# Patient Record
Sex: FEMALE | Race: WHITE | NOT HISPANIC OR LATINO | Employment: OTHER | URBAN - METROPOLITAN AREA
[De-identification: names, ages, dates, MRNs, and addresses within clinical notes are randomized per-mention and may not be internally consistent; named-entity substitution may affect disease eponyms.]

---

## 2024-08-12 ENCOUNTER — APPOINTMENT (OUTPATIENT)
Dept: CT IMAGING | Facility: CLINIC | Age: 79
End: 2024-08-12
Attending: STUDENT IN AN ORGANIZED HEALTH CARE EDUCATION/TRAINING PROGRAM
Payer: MEDICARE

## 2024-08-12 ENCOUNTER — HOSPITAL ENCOUNTER (EMERGENCY)
Facility: CLINIC | Age: 79
Discharge: HOME | End: 2024-08-12
Attending: EMERGENCY MEDICINE | Admitting: STUDENT IN AN ORGANIZED HEALTH CARE EDUCATION/TRAINING PROGRAM
Payer: MEDICARE

## 2024-08-12 VITALS
HEART RATE: 73 BPM | SYSTOLIC BLOOD PRESSURE: 141 MMHG | OXYGEN SATURATION: 97 % | RESPIRATION RATE: 18 BRPM | TEMPERATURE: 98.4 F | DIASTOLIC BLOOD PRESSURE: 61 MMHG | WEIGHT: 160 LBS

## 2024-08-12 DIAGNOSIS — K57.92 ACUTE DIVERTICULITIS: ICD-10-CM

## 2024-08-12 LAB
ALBUMIN SERPL BCG-MCNC: 4.1 G/DL (ref 3.5–5.2)
ALBUMIN UR-MCNC: NEGATIVE MG/DL
ALP SERPL-CCNC: 81 U/L (ref 40–150)
ALT SERPL W P-5'-P-CCNC: 22 U/L (ref 0–50)
ANION GAP SERPL CALCULATED.3IONS-SCNC: 11 MMOL/L (ref 7–15)
APPEARANCE UR: CLEAR
AST SERPL W P-5'-P-CCNC: 22 U/L (ref 0–45)
BASOPHILS # BLD MANUAL: 0.1 10E3/UL (ref 0–0.2)
BASOPHILS NFR BLD MANUAL: 1 %
BILIRUB SERPL-MCNC: 0.2 MG/DL
BILIRUB UR QL STRIP: NEGATIVE
BUN SERPL-MCNC: 17.4 MG/DL (ref 8–23)
CALCIUM SERPL-MCNC: 9.1 MG/DL (ref 8.8–10.4)
CHLORIDE SERPL-SCNC: 106 MMOL/L (ref 98–107)
COLOR UR AUTO: YELLOW
CREAT SERPL-MCNC: 0.74 MG/DL (ref 0.51–0.95)
EGFRCR SERPLBLD CKD-EPI 2021: 81 ML/MIN/1.73M2
EOSINOPHIL # BLD MANUAL: 0 10E3/UL (ref 0–0.7)
EOSINOPHIL NFR BLD MANUAL: 0 %
ERYTHROCYTE [DISTWIDTH] IN BLOOD BY AUTOMATED COUNT: 12.8 % (ref 10–15)
GLUCOSE SERPL-MCNC: 106 MG/DL (ref 70–99)
GLUCOSE UR STRIP-MCNC: NEGATIVE MG/DL
HCO3 SERPL-SCNC: 24 MMOL/L (ref 22–29)
HCT VFR BLD AUTO: 43.4 % (ref 35–47)
HGB BLD-MCNC: 14.7 G/DL (ref 11.7–15.7)
HGB UR QL STRIP: NEGATIVE
KETONES UR STRIP-MCNC: NEGATIVE MG/DL
LEUKOCYTE ESTERASE UR QL STRIP: NEGATIVE
LIPASE SERPL-CCNC: 18 U/L (ref 13–60)
LYMPHOCYTES # BLD MANUAL: 1.9 10E3/UL (ref 0.8–5.3)
LYMPHOCYTES NFR BLD MANUAL: 16 %
MCH RBC QN AUTO: 32 PG (ref 26.5–33)
MCHC RBC AUTO-ENTMCNC: 33.9 G/DL (ref 31.5–36.5)
MCV RBC AUTO: 95 FL (ref 78–100)
MONOCYTES # BLD MANUAL: 1.4 10E3/UL (ref 0–1.3)
MONOCYTES NFR BLD MANUAL: 12 %
MUCOUS THREADS #/AREA URNS LPF: PRESENT /LPF
NEUTROPHILS # BLD MANUAL: 8.4 10E3/UL (ref 1.6–8.3)
NEUTROPHILS NFR BLD MANUAL: 71 %
NITRATE UR QL: NEGATIVE
NRBC # BLD AUTO: 0 10E3/UL
NRBC BLD AUTO-RTO: 0 /100
PH UR STRIP: 5 [PH] (ref 5–7)
PLAT MORPH BLD: ABNORMAL
PLATELET # BLD AUTO: 282 10E3/UL (ref 150–450)
POTASSIUM SERPL-SCNC: 4 MMOL/L (ref 3.4–5.3)
PROT SERPL-MCNC: 6.8 G/DL (ref 6.4–8.3)
RBC # BLD AUTO: 4.59 10E6/UL (ref 3.8–5.2)
RBC MORPH BLD: ABNORMAL
RBC URINE: 1 /HPF
SODIUM SERPL-SCNC: 141 MMOL/L (ref 135–145)
SP GR UR STRIP: 1.01 (ref 1–1.03)
SQUAMOUS EPITHELIAL: 3 /HPF
UROBILINOGEN UR STRIP-MCNC: NORMAL MG/DL
WBC # BLD AUTO: 11.9 10E3/UL (ref 4–11)
WBC URINE: 3 /HPF

## 2024-08-12 PROCEDURE — 80053 COMPREHEN METABOLIC PANEL: CPT | Performed by: STUDENT IN AN ORGANIZED HEALTH CARE EDUCATION/TRAINING PROGRAM

## 2024-08-12 PROCEDURE — 96374 THER/PROPH/DIAG INJ IV PUSH: CPT | Mod: 59 | Performed by: STUDENT IN AN ORGANIZED HEALTH CARE EDUCATION/TRAINING PROGRAM

## 2024-08-12 PROCEDURE — 36415 COLL VENOUS BLD VENIPUNCTURE: CPT | Performed by: STUDENT IN AN ORGANIZED HEALTH CARE EDUCATION/TRAINING PROGRAM

## 2024-08-12 PROCEDURE — 81001 URINALYSIS AUTO W/SCOPE: CPT | Performed by: STUDENT IN AN ORGANIZED HEALTH CARE EDUCATION/TRAINING PROGRAM

## 2024-08-12 PROCEDURE — 85007 BL SMEAR W/DIFF WBC COUNT: CPT | Performed by: STUDENT IN AN ORGANIZED HEALTH CARE EDUCATION/TRAINING PROGRAM

## 2024-08-12 PROCEDURE — 83690 ASSAY OF LIPASE: CPT | Performed by: STUDENT IN AN ORGANIZED HEALTH CARE EDUCATION/TRAINING PROGRAM

## 2024-08-12 PROCEDURE — 250N000009 HC RX 250: Performed by: STUDENT IN AN ORGANIZED HEALTH CARE EDUCATION/TRAINING PROGRAM

## 2024-08-12 PROCEDURE — 74177 CT ABD & PELVIS W/CONTRAST: CPT | Mod: MG

## 2024-08-12 PROCEDURE — 99284 EMERGENCY DEPT VISIT MOD MDM: CPT | Performed by: STUDENT IN AN ORGANIZED HEALTH CARE EDUCATION/TRAINING PROGRAM

## 2024-08-12 PROCEDURE — 250N000011 HC RX IP 250 OP 636: Performed by: STUDENT IN AN ORGANIZED HEALTH CARE EDUCATION/TRAINING PROGRAM

## 2024-08-12 PROCEDURE — 99285 EMERGENCY DEPT VISIT HI MDM: CPT | Mod: 25 | Performed by: STUDENT IN AN ORGANIZED HEALTH CARE EDUCATION/TRAINING PROGRAM

## 2024-08-12 PROCEDURE — 85027 COMPLETE CBC AUTOMATED: CPT | Performed by: STUDENT IN AN ORGANIZED HEALTH CARE EDUCATION/TRAINING PROGRAM

## 2024-08-12 RX ORDER — ONDANSETRON 4 MG/1
4 TABLET, ORALLY DISINTEGRATING ORAL EVERY 8 HOURS PRN
Qty: 10 TABLET | Refills: 0 | Status: SHIPPED | OUTPATIENT
Start: 2024-08-12

## 2024-08-12 RX ORDER — ONDANSETRON 2 MG/ML
4 INJECTION INTRAMUSCULAR; INTRAVENOUS EVERY 30 MIN PRN
Status: DISCONTINUED | OUTPATIENT
Start: 2024-08-12 | End: 2024-08-13 | Stop reason: HOSPADM

## 2024-08-12 RX ORDER — IOPAMIDOL 755 MG/ML
78 INJECTION, SOLUTION INTRAVASCULAR ONCE
Status: COMPLETED | OUTPATIENT
Start: 2024-08-12 | End: 2024-08-12

## 2024-08-12 RX ORDER — OXYCODONE HYDROCHLORIDE 5 MG/1
2.5-5 TABLET ORAL EVERY 6 HOURS PRN
Qty: 6 TABLET | Refills: 0 | Status: SHIPPED | OUTPATIENT
Start: 2024-08-12

## 2024-08-12 RX ADMIN — SODIUM CHLORIDE 59 ML: 9 INJECTION, SOLUTION INTRAVENOUS at 22:15

## 2024-08-12 RX ADMIN — IOPAMIDOL 78 ML: 755 INJECTION, SOLUTION INTRAVENOUS at 22:12

## 2024-08-12 RX ADMIN — ONDANSETRON 4 MG: 2 INJECTION INTRAMUSCULAR; INTRAVENOUS at 21:55

## 2024-08-12 ASSESSMENT — ACTIVITIES OF DAILY LIVING (ADL)
ADLS_ACUITY_SCORE: 35

## 2024-08-12 ASSESSMENT — COLUMBIA-SUICIDE SEVERITY RATING SCALE - C-SSRS
2. HAVE YOU ACTUALLY HAD ANY THOUGHTS OF KILLING YOURSELF IN THE PAST MONTH?: NO
6. HAVE YOU EVER DONE ANYTHING, STARTED TO DO ANYTHING, OR PREPARED TO DO ANYTHING TO END YOUR LIFE?: NO
1. IN THE PAST MONTH, HAVE YOU WISHED YOU WERE DEAD OR WISHED YOU COULD GO TO SLEEP AND NOT WAKE UP?: NO

## 2024-08-13 NOTE — ED TRIAGE NOTES
Pt presents with concerns of lower left abd wrapping around to left groin area. Pt states pain is worse after eating. Pt states she last took aleve and tylenol around 1800.      Triage Assessment (Adult)       Row Name 08/12/24 6132          Triage Assessment    Airway WDL WDL        Respiratory WDL    Respiratory WDL WDL        Skin Circulation/Temperature WDL    Skin Circulation/Temperature WDL WDL        Cardiac WDL    Cardiac WDL WDL        Peripheral/Neurovascular WDL    Peripheral Neurovascular WDL WDL        Cognitive/Neuro/Behavioral WDL    Cognitive/Neuro/Behavioral WDL WDL

## 2024-08-13 NOTE — ED PROVIDER NOTES
History     Chief Complaint   Patient presents with    Abdominal Pain     HPI  Vera Lyon is a 78 year old female who has asthma, GERD, hypertension, hypothyroidism, history of SVT, history of CVA, history of polymyalgia rheumatica who presents to the emergency department for evaluation of abdominal pain.  Patient states that she has had left-sided abdominal pain that has come and gone over the last 4 days.  However, she states that pain has been progressively worsening.  She was going to wait and go to the doctor tomorrow, but the pain became too intense.  She endorses a sharp pain in her left lower abdomen that radiates into her left groin.  She states that her pain is worsened after eating.  She states that it seems to worsen with all types of food.  She endorses nausea without vomiting.  She denies fever.  She had a normal bowel movement 3 or 4 hours ago.  She denies diarrhea, melena or hematochezia.  She has had no issues urinating and denies dysuria, urgency or frequency.  She has no hematuria.  No history of kidney stones.  No chest pain or trouble breathing.  No rashes.  No trauma or injuries.  She has chronic back pain, but states her back pain is not any different than usual.  Abdominal surgical history notable for  and hysterectomy.  She reports having a colonoscopy in the past that showed diverticulosis.  She denies a history of diverticulitis.  She denies alcohol or drug use.  She has been taking Tylenol and ibuprofen which has been helpful.  She last took a dose about 2-1/2 hours prior to arrival and she reports feeling better after this.  She called the nurse triage line who recommended she come in for evaluation.    Allergies:  No Known Allergies    Problem List:    There are no problems to display for this patient.       Past Medical History:    No past medical history on file.    Past Surgical History:    No past surgical history on file.    Family History:    No family history on  file.    Social History:  Marital Status:   [2]        Medications:    amoxicillin-clavulanate (AUGMENTIN) 875-125 MG tablet  ondansetron (ZOFRAN ODT) 4 MG ODT tab  oxyCODONE (ROXICODONE) 5 MG tablet          Review of Systems  See HPI  Physical Exam   BP: (!) 163/61  Pulse: 83  Temp: 98.4  F (36.9  C)  Resp: 18  Weight: 72.6 kg (160 lb)  SpO2: 98 %      Physical Exam  BP (!) 141/61   Pulse 73   Temp 98.4  F (36.9  C) (Oral)   Resp 18   Wt 72.6 kg (160 lb)   SpO2 97%   General: alert, interactive, in no apparent distress  Head: atraumatic  Nose: no rhinorrhea or epistaxis  Ears: no external auditory canal discharge or bleeding.    Eyes: Sclera nonicteric. Conjunctiva noninjected. PERRL, EOMI  Mouth: no tonsillar erythema, edema, or exudate.  Moist mucous membranes  Neck: supple, moving spontaneously no midline cervical tenderness  Lungs: No increased work of breathing.  Clear to auscultation bilaterally.  CV: RRR, peripheral pulses palpable and symmetric  Abdomen: soft, tender to palpation in the left lower quadrant.  No rigidity or rebound or guarding.  Extremities: Warm and well-perfused.    Skin: no rash or diaphoresis  Neuro: CN II-XII grossly intact, strength 5/5 in UE and LEs bilaterally, sensation intact to light touch in UE and LEs bilaterally;     ED Course        Procedures             Critical Care time:  none             Results for orders placed or performed during the hospital encounter of 08/12/24 (from the past 24 hour(s))   UA with Microscopic reflex to Culture    Specimen: Urine, Clean Catch   Result Value Ref Range    Color Urine Yellow Colorless, Straw, Light Yellow, Yellow    Appearance Urine Clear Clear    Glucose Urine Negative Negative mg/dL    Bilirubin Urine Negative Negative    Ketones Urine Negative Negative mg/dL    Specific Gravity Urine 1.014 1.003 - 1.035    Blood Urine Negative Negative    pH Urine 5.0 5.0 - 7.0    Protein Albumin Urine Negative Negative mg/dL     Urobilinogen Urine Normal Normal, 2.0 mg/dL    Nitrite Urine Negative Negative    Leukocyte Esterase Urine Negative Negative    Mucus Urine Present (A) None Seen /LPF    RBC Urine 1 <=2 /HPF    WBC Urine 3 <=5 /HPF    Squamous Epithelials Urine 3 (H) <=1 /HPF    Narrative    Urine Culture not indicated   CBC with platelets differential    Narrative    The following orders were created for panel order CBC with platelets differential.  Procedure                               Abnormality         Status                     ---------                               -----------         ------                     CBC with platelets and d...[[id number]]  Abnormal            Final result               Manual Differential[[id number]]          Abnormal            Final result                 Please view results for these tests on the individual orders.   Comprehensive metabolic panel   Result Value Ref Range    Sodium 141 135 - 145 mmol/L    Potassium 4.0 3.4 - 5.3 mmol/L    Carbon Dioxide (CO2) 24 22 - 29 mmol/L    Anion Gap 11 7 - 15 mmol/L    Urea Nitrogen 17.4 8.0 - 23.0 mg/dL    Creatinine 0.74 0.51 - 0.95 mg/dL    GFR Estimate 81 >60 mL/min/1.73m2    Calcium 9.1 8.8 - 10.4 mg/dL    Chloride 106 98 - 107 mmol/L    Glucose 106 (H) 70 - 99 mg/dL    Alkaline Phosphatase 81 40 - 150 U/L    AST 22 0 - 45 U/L    ALT 22 0 - 50 U/L    Protein Total 6.8 6.4 - 8.3 g/dL    Albumin 4.1 3.5 - 5.2 g/dL    Bilirubin Total 0.2 <=1.2 mg/dL   Lipase   Result Value Ref Range    Lipase 18 13 - 60 U/L   CBC with platelets and differential   Result Value Ref Range    WBC Count 11.9 (H) 4.0 - 11.0 10e3/uL    RBC Count 4.59 3.80 - 5.20 10e6/uL    Hemoglobin 14.7 11.7 - 15.7 g/dL    Hematocrit 43.4 35.0 - 47.0 %    MCV 95 78 - 100 fL    MCH 32.0 26.5 - 33.0 pg    MCHC 33.9 31.5 - 36.5 g/dL    RDW 12.8 10.0 - 15.0 %    Platelet Count 282 150 - 450 10e3/uL    NRBCs per 100 WBC 0 <1 /100    Absolute NRBCs 0.0 10e3/uL   Manual Differential   Result Value  Ref Range    % Neutrophils 71 %    % Lymphocytes 16 %    % Monocytes 12 %    % Eosinophils 0 %    % Basophils 1 %    Absolute Neutrophils 8.4 (H) 1.6 - 8.3 10e3/uL    Absolute Lymphocytes 1.9 0.8 - 5.3 10e3/uL    Absolute Monocytes 1.4 (H) 0.0 - 1.3 10e3/uL    Absolute Eosinophils 0.0 0.0 - 0.7 10e3/uL    Absolute Basophils 0.1 0.0 - 0.2 10e3/uL    RBC Morphology Confirmed RBC Indices     Platelet Assessment  Automated Count Confirmed. Platelet morphology is normal.     Automated Count Confirmed. Platelet morphology is normal.   CT Abdomen Pelvis w Contrast    Narrative    EXAM: CT ABDOMEN PELVIS W CONTRAST  LOCATION: [facility]  DATE: 8/12/2024    INDICATION: Left lower quadrant abdominal pain  COMPARISON: CT abdomen and pelvis 3/23/2024  TECHNIQUE: CT scan of the abdomen and pelvis was performed following injection of IV contrast. Multiplanar reformats were obtained. Dose reduction techniques were used.  CONTRAST: 78 ml Isovue 370    FINDINGS:   LOWER CHEST: Small hiatal hernia.    HEPATOBILIARY: Cholecystectomy.    PANCREAS: Normal.    SPLEEN: Calcified granulomas.    ADRENAL GLANDS: Normal.    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 Otherwise unremarkable.    BOWEL: Acute diverticulitis of the distal descending colon. No evidence of perforation or abscess.    LYMPH NODES: Normal.    VASCULATURE: Unremarkable    PELVIC ORGANS: Hysterectomy    MUSCULOSKELETAL: Unremarkable      Impression    IMPRESSION:   1.  Acute uncomplicated diverticulitis of the distal descending colon.       Medications   ondansetron (ZOFRAN) injection 4 mg (4 mg Intravenous $Given 8/12/24 2154)   iopamidol (ISOVUE-370) solution 78 mL (78 mLs Intravenous $Given 8/12/24 2212)   sodium chloride 0.9 % bag 500mL for CT scan flush use (59 mLs As instructed $Given 8/12/24 2215)       Assessments & Plan (with Medical Decision Making)     I have reviewed the nursing notes.    I have  reviewed the findings, diagnosis, plan and need for follow up with the patient.          Medical Decision Making  Vera Lyon is a 78 year old female who has asthma, GERD, hypertension, hypothyroidism, history of SVT, history of CVA, history of polymyalgia rheumatica who presents to the emergency department for evaluation of abdominal pain.  Vital signs reviewed notable for hypertension, otherwise afebrile and reassuring.  Patient is nontoxic on exam.  Pain is actually well-controlled at this time, patient offered pain medication and she deferred for now.  She is given Zofran for symptoms.  Labs and imaging independently interpreted by me.  She is noted to have a mild leukocytosis of 11.9 with a left shift.  She is not anemic.  CMP, UA and lipase are normal.  CT of the abdomen pelvis was independently reviewed by me and radiology report reviewed.  Patient noted to have uncomplicated diverticulitis of the descending colon.  Patient was reassessed and reports feeling better.  I think a trial of outpatient management is appropriate.  We discussed watching and waiting versus a trial of antibiotics and patient would like to try antibiotics.  Will prescribe Augmentin.  We also discussed initiating a liquid diet and advancing diet as tolerated.  I provided Zofran and oxycodone to use as needed.  Recommended close outpatient follow-up to ensure she is improving and to discuss if she needs another colonoscopy.  Strict return precautions were discussed.  All questions were answered.  The patient is discharged in stable condition peer        New Prescriptions    AMOXICILLIN-CLAVULANATE (AUGMENTIN) 875-125 MG TABLET    Take 1 tablet by mouth 2 times daily    ONDANSETRON (ZOFRAN ODT) 4 MG ODT TAB    Take 1 tablet (4 mg) by mouth every 8 hours as needed for nausea    OXYCODONE (ROXICODONE) 5 MG TABLET    Take 0.5-1 tablets (2.5-5 mg) by mouth every 6 hours as needed for severe pain       Final diagnoses:   Acute  diverticulitis       8/12/2024   [facility] EMERGENCY DEPT       Sabino Chacon MD  08/12/24 5323       Sabino Chacon MD  08/12/24 3085

## 2024-08-13 NOTE — DISCHARGE INSTRUCTIONS
You were diagnosed with diverticulitis.  This is inflammation of one of the pouches of your colon.  You can read the attached documents for additional information.  This will be treated with antibiotics and a liquid diet.  You should initiate a liquid diet now and gradually increase your diet as you are feeling better.  Use Tylenol and ibuprofen as needed for pain.  Use the oxycodone for severe pain only.  Use the Zofran as needed for nausea or vomiting.  Follow-up with your regular doctor soon as you are able.  If you develop fever, worsening pain, or any other new or concerning symptoms return to the ER.

## 2024-10-22 ENCOUNTER — HOSPITAL ENCOUNTER (EMERGENCY)
Facility: CLINIC | Age: 79
Discharge: HOME | End: 2024-10-22
Attending: FAMILY MEDICINE | Admitting: FAMILY MEDICINE
Payer: MEDICARE

## 2024-10-22 ENCOUNTER — APPOINTMENT (OUTPATIENT)
Dept: ULTRASOUND IMAGING | Facility: CLINIC | Age: 79
End: 2024-10-22
Attending: FAMILY MEDICINE
Payer: MEDICARE

## 2024-10-22 VITALS
TEMPERATURE: 97.6 F | WEIGHT: 168 LBS | BODY MASS INDEX: 30.91 KG/M2 | DIASTOLIC BLOOD PRESSURE: 80 MMHG | RESPIRATION RATE: 18 BRPM | HEIGHT: 62 IN | OXYGEN SATURATION: 94 % | SYSTOLIC BLOOD PRESSURE: 161 MMHG | HEART RATE: 75 BPM

## 2024-10-22 DIAGNOSIS — L60.3 NAIL DYSTROPHY: ICD-10-CM

## 2024-10-22 DIAGNOSIS — M79.89 LEFT LEG SWELLING: ICD-10-CM

## 2024-10-22 PROCEDURE — 250N000013 HC RX MED GY IP 250 OP 250 PS 637: Performed by: FAMILY MEDICINE

## 2024-10-22 PROCEDURE — 93971 EXTREMITY STUDY: CPT | Mod: LT

## 2024-10-22 PROCEDURE — 99284 EMERGENCY DEPT VISIT MOD MDM: CPT | Mod: 25 | Performed by: FAMILY MEDICINE

## 2024-10-22 PROCEDURE — 99283 EMERGENCY DEPT VISIT LOW MDM: CPT | Performed by: FAMILY MEDICINE

## 2024-10-22 RX ORDER — CEPHALEXIN 500 MG/1
500 CAPSULE ORAL 2 TIMES DAILY
Qty: 30 CAPSULE | Refills: 0 | Status: SHIPPED | OUTPATIENT
Start: 2024-10-22

## 2024-10-22 RX ORDER — CEPHALEXIN 500 MG/1
500 CAPSULE ORAL ONCE
Status: COMPLETED | OUTPATIENT
Start: 2024-10-22 | End: 2024-10-22

## 2024-10-22 RX ADMIN — CEPHALEXIN 500 MG: 500 CAPSULE ORAL at 21:08

## 2024-10-22 ASSESSMENT — COLUMBIA-SUICIDE SEVERITY RATING SCALE - C-SSRS
1. IN THE PAST MONTH, HAVE YOU WISHED YOU WERE DEAD OR WISHED YOU COULD GO TO SLEEP AND NOT WAKE UP?: NO
2. HAVE YOU ACTUALLY HAD ANY THOUGHTS OF KILLING YOURSELF IN THE PAST MONTH?: NO
6. HAVE YOU EVER DONE ANYTHING, STARTED TO DO ANYTHING, OR PREPARED TO DO ANYTHING TO END YOUR LIFE?: NO

## 2024-10-22 ASSESSMENT — ACTIVITIES OF DAILY LIVING (ADL)
ADLS_ACUITY_SCORE: 35
ADLS_ACUITY_SCORE: 35

## 2024-10-22 NOTE — ED TRIAGE NOTES
Pt reports swelling to left foot that radiates up leg, per daughter reports leg is red, concerned about possible cellulitis

## 2024-10-23 NOTE — ED PROVIDER NOTES
"  History     Chief Complaint   Patient presents with    Leg Swelling     Pt reports swelling to left foot that radiates up leg, per daughter reports leg is red, concerned about possible cellulitis      HPI    Vera Lyon is a 79 year old female who comes in with swelling and pain of her left leg and foot that she noticed today.  She has had a chronically dystrophic great toenail since an injury a few years ago.  The foot doctor who has not recommended removal.  Today she has noticed pain around the toe and on the top of the foot and increased edema compared to the baseline in the left leg.  She does have bilateral lower extremity edema.  She does not have a history of DVT or PE.  She has not noticed chest pain, shortness of breath, rapid or irregular heartbeat.  She takes a low-dose aspirin daily in addition to her other medications for blood pressure but no anticoagulants.  She has not had systemic symptoms of illness including no fever.    Past medical history through care everywhere from the [facility] system:  \"Allergies  Reconcile with Patient's ChartAllergies  Active Allergy Reactions Criticality Noted Date Comments   Codeine Nausea Only   08/05/2005     Unlisted Allergen (Include Detail In Comments) Rash   08/05/2005 States had reaction to pneumonia vaccine 1 week after. States was successfully treated for \"surface rash\" with cortisone cream.    Simvastatin Other - Describe In Comment Field   10/07/2014 Joint pains       Medications  Reconcile with Patient's ChartMedications  Medication Sig Dispensed Refills Start Date End Date Status   Cholecalciferol, Vitamin D3, (VITAMIN D-3) 2,000 unit tablet  Take 2,000 Units by mouth once daily.   0 04/23/2013   Active   Calcium-Magnesium-Zinc tab  Take 1 tablet by mouth once daily.   0 01/26/2017   Active   acetaminophen (TYLENOL EXTRA STRENGTH) 500 mg tablet  Take 500 mg by mouth every 6 hours if needed. Max acetaminophen dose: 4000mg in 24 hrs.         Active "   aspirin chewable 81 mg chewable tablet  Chew 1 Tablet (81 mg) by mouth once daily with a meal.   0 01/26/2022   Active   metoprolol succinate (TOPROL XL) 25 mg Sustained-Release tablet   Indications: Essential hypertension Take 1 Tablet (25 mg) by mouth once daily. 90 Tablet  2 04/29/2024   Active   levothyroxine (SYNTHROID) 112 mcg tablet   Indications: Hypothyroidism (acquired) Take 1 Tablet (112 mcg) by mouth once daily. 90 Tablet  1 06/13/2024   Active   rosuvastatin (CRESTOR) 20 mg tablet   Indications: Hypercholesterolemia Take 1 Tablet (20 mg) by mouth once daily. 90 Tablet  1 07/10/2024   Active   donepeziL (ARICEPT) 10 mg tablet  Take 10 mg by mouth once daily. From [facility] neurology.     03/22/2024   Active   famotidine (PEPCID) 20 mg tablet   Indications: Chronic GERD Take 1 Tablet (20 mg) by mouth once daily. 90 Tablet  1 09/05/2024   Active   sertraline (ZOLOFT) 25 mg tablet   Indications: Recurrent major depressive disorder, remission status unspecified (HC) Take 1 Tablet (25 mg) by mouth once daily. 90 Tablet  1 09/05/2024   Active   gabapentin (NEURONTIN) 100 mg capsule   Indications: Spondylosis of cervical region without myelopathy or radiculopathy, Spinal stenosis of lumbar region without neurogenic claudication Take 3 Capsules (300 mg) by mouth at bedtime. 270 Capsule  1 09/05/2024   Active     Active Problems  Reconcile with Patient's ChartActive Problems  Problem Noted Date Diagnosed Date   Cognitive impairment 08/23/2024     Overview:    Formatting of this note might be different from the original.  3/2024 neurology- schedule neuropsych testing   Abdominal pain 08/23/2024     Overview:    Formatting of this note might be different from the original.  3/2024 CT abdomen negative - H/O diverticulitis  Colonoscopy 2021 negative    Gastroenterology consult ordered   INTERNAL CAROTID ANEURYSM- extradural. 05/30/2022     Overview:    Formatting of this note might be different from the  "original.  4/2022 neuroradiology - 1.5mm cavernous segment extradural left internal carotid artery aneurysm.    . Dr. Suarez did discuss that given her age, aneurysm small size, and that it is extradural he does not feels strongly that she needs to have this aneurysm followed with surveillance imaging and he will leave it up to Vera if she wants to have re-imaging for her aneurysm in 1 year.   VERTEBRAL ARTERY STENOSIS-left 05/30/2022     Overview:    Formatting of this note might be different from the original.  4/2022 neuroradiology - mild left vertebral stenosis.  Dr. Suarez recommends clopidogrel for 3 months along with aspirin 81mg daily. Then aspirin 81mg daily life long in regards to her mild stenosis.  NO further testing   Paroxysmal SVT (supraventricular tachycardia) 05/30/2022     Overview:    Formatting of this note might be different from the original.  5/2022 cardiology - transient SVT  On toprol  4/2022 echo- normal EF   H/O CVA . 05/30/2022     Overview:    Formatting of this note might be different from the original.  Presented with dizziness  1/2022 MRI brain Tiny chronic linear left cerebellar infarct , small area of chronic cortical/subcortical gliosis likely representing old infarct involving the mid to lateral right perirolandic region  Holter ttransientSVT  MRA- left carotid aneurysm and mild vertebral stenosis- seen neuroradiology  aspirin and Plavix for 6months and then aspirin lifelong   LUMBAR SPINAL STENOSIS . 10/23/2021     Overview:    Formatting of this note might be different from the original.  2/2021 MRI  1. Overall progression of multilevel lumbar spondylosis with degenerative grade 1 anterolisthesis of L4 on L5, progressed since 7/17/2003.  2. Increased moderate spinal canal stenosis at L3-L4 and L4-L5.  3. Chronic small left subarticular disc protrusion at L5-S1 contacts the descending left S1 nerve roots\".       Allergies:  Allergies   Allergen Reactions    Codeine Nausea    " "Simvastatin Other (See Comments)     Joint pains     Joint pains       Problem List:    There are no problems to display for this patient.       Past Medical History:    No past medical history on file.    Past Surgical History:    No past surgical history on file.    Family History:    No family history on file.    Social History:  Marital Status:   [2]        Medications:    cephALEXin (KEFLEX) 500 MG capsule  amoxicillin-clavulanate (AUGMENTIN) 875-125 MG tablet  ondansetron (ZOFRAN ODT) 4 MG ODT tab  oxyCODONE (ROXICODONE) 5 MG tablet          Review of Systems  All other systems are reviewed and are negative    Physical Exam   BP: (!) 161/80  Pulse: 75  Temp: 97.6  F (36.4  C)  Resp: 18  Height: 157.5 cm (5' 2\")  Weight: 76.2 kg (168 lb)  SpO2: 94 %      Physical Exam    Nursing note and vitals were reviewed.  Constitutional: Awake and alert, adequately nourished and developed appearing 79-year-old in no apparent discomfort, who does not appear acutely ill, and who answers questions appropriately and cooperates with examination.  HEENT: Speech is fluent.  Voice quality is normal.  EOMI.   Neck: Freely mobile.  Cardiovascular: Cardiac examination reveals normal heart rate and regular rhythm without murmur.  Pulmonary/Chest: Breathing is unlabored.  Breath sounds are clear and equal bilaterally.  There no retractions, tachypnea, rales, wheezes, or rhonchi.  Musculoskeletal: Extremities are warm and well-perfused and with bilateral pitting edema pretibially 2+ on the left, 1+ on the right.  She feels that her left foot and leg are red but I do not appreciate erythema.  There are telangiectasias on the dorsum of both feet.  There is not any increased warmth in the left leg.  There is some erythema around the great toe but no purulent discharge.  Neurological: Alert, oriented, thought content logical, coherent   Skin: Warm, dry, no rashes.  Psychiatric: Affect broad and appropriate.    ED Course      "   Procedures              Critical Care time:  none            Results for orders placed or performed during the hospital encounter of 10/22/24 (from the past 24 hour(s))   US Lower Extremity Venous Duplex Left    Narrative    EXAM: US LOWER EXTREMITY VENOUS DUPLEX LEFT  LOCATION: [facility]  DATE: 10/22/2024    INDICATION: left leg swelling and pain  COMPARISON: None.  TECHNIQUE: Venous Duplex ultrasound of the left lower extremity with and without compression, augmentation and duplex. Color flow and spectral Doppler with waveform analysis performed.    FINDINGS: Exam includes the common femoral, femoral, popliteal, and contralateral common femoral veins as well as segmentally visualized deep calf veins and greater saphenous vein.     LEFT: No deep vein thrombosis. No superficial thrombophlebitis. No popliteal cyst.      Impression    IMPRESSION:  1.  No deep venous thrombosis in the left lower extremity.       Medications   cephALEXin (KEFLEX) capsule 500 mg (500 mg Oral $Given 10/22/24 2108)       Assessments & Plan (with Medical Decision Making)     79-year-old female presented with pain, redness, increased edema of the left leg for which she was concerned about development of cellulitis.  She has had a dystrophic great toenail since an injury several years ago and it is quite thickened and causes irritation around the edges of the nail.  She has seen podiatry who have not recommended permanent nail removal.  Today she underwent duplex venous ultrasound to assess for DVT given the asymmetric edema present.  None was seen.  I recommended we treat her for possible early cellulitis.  Given that she is having pain redness and warmth and has the irritating dystrophic nail is a portal of entry.  She will take cephalexin 500 mg 4 times daily for 7 days and rest and elevate the leg as much as possible.  If she has not had improvement in 2 days she needs to be seen for recheck.  She should  return if any new or worsening symptoms develop.  I also recommended they consider permanent nail removal as this is going to continue to cause pressure effects that could cause pain as well as infection.  She and her daughter expressed understanding and their questions were answered.    I have reviewed the nursing notes.    I have reviewed the findings, diagnosis, plan and need for follow up with the patient.           Discharge Medication List as of 10/22/2024  9:09 PM        START taking these medications    Details   cephALEXin (KEFLEX) 500 MG capsule Take 1 capsule (500 mg) by mouth 2 times daily., Disp-30 capsule, R-0, [facility]             Final diagnoses:   Left leg swelling   Nail dystrophy       10/22/2024   [facility] EMERGENCY DEPT       Danis Child MD  10/22/24 5684

## 2024-10-23 NOTE — DISCHARGE INSTRUCTIONS
You may be starting to develop cellulitis in this leg.  There is no blood clot in the leg.  Take cephalexin 500 mg 4 times per day for 7 days.  Rest and elevate your leg is much as possible for 2 days.  Be seen if there is not improvement within 2 days or significant new or worsening symptoms at any time.  I recommend he talk to podiatry about having the nail permanently removed.